# Patient Record
Sex: FEMALE | Race: WHITE | Employment: FULL TIME | URBAN - METROPOLITAN AREA
[De-identification: names, ages, dates, MRNs, and addresses within clinical notes are randomized per-mention and may not be internally consistent; named-entity substitution may affect disease eponyms.]

---

## 2017-01-10 ENCOUNTER — OFFICE VISIT (OUTPATIENT)
Dept: INTERNAL MEDICINE CLINIC | Facility: CLINIC | Age: 47
End: 2017-01-10

## 2017-01-10 VITALS
WEIGHT: 184 LBS | RESPIRATION RATE: 16 BRPM | HEIGHT: 61 IN | HEART RATE: 84 BPM | DIASTOLIC BLOOD PRESSURE: 72 MMHG | BODY MASS INDEX: 34.74 KG/M2 | SYSTOLIC BLOOD PRESSURE: 110 MMHG

## 2017-01-10 DIAGNOSIS — Z51.81 ENCOUNTER FOR THERAPEUTIC DRUG MONITORING: Primary | ICD-10-CM

## 2017-01-10 DIAGNOSIS — E66.9 OBESITY (BMI 30.0-34.9): ICD-10-CM

## 2017-01-10 PROCEDURE — 99213 OFFICE O/P EST LOW 20 MIN: CPT | Performed by: NURSE PRACTITIONER

## 2017-01-10 RX ORDER — ZONISAMIDE 100 MG/1
100 CAPSULE ORAL NIGHTLY
Qty: 30 CAPSULE | Refills: 0 | Status: SHIPPED | OUTPATIENT
Start: 2017-01-10 | End: 2017-02-09

## 2017-01-10 RX ORDER — BUPROPION HYDROCHLORIDE 100 MG/1
100 TABLET, EXTENDED RELEASE ORAL 2 TIMES DAILY
COMMUNITY
End: 2017-01-10 | Stop reason: DRUGHIGH

## 2017-01-10 RX ORDER — BUPROPION HYDROCHLORIDE 150 MG/1
150 TABLET ORAL DAILY
Qty: 30 TABLET | Refills: 0 | Status: SHIPPED | OUTPATIENT
Start: 2017-01-10 | End: 2017-03-07

## 2017-01-10 RX ORDER — BUPROPION HYDROCHLORIDE 100 MG/1
100 TABLET, EXTENDED RELEASE ORAL 2 TIMES DAILY
Qty: 60 TABLET | Refills: 0 | Status: CANCELLED | OUTPATIENT
Start: 2017-01-10

## 2017-01-10 NOTE — PROGRESS NOTES
CC: Patient presents with:  Weight Check: 3 pound weight gain       HPI:   Obesity. Patient gained weight on 1 month of wellbutrin but felt it really helped with stress and stress eating.  Some sleeping problems though but not sure if related to well without murmur  GI: +BS's    No orders of the defined types were placed in this encounter. Signed Prescriptions Disp Refills    zonisamide 100 MG Oral Cap 30 capsule 0      Sig: Take 1 capsule (100 mg total) by mouth nightly.       BuPROPion HCl ER,

## 2017-01-10 NOTE — PATIENT INSTRUCTIONS
Weight Management: Exercise and Activity  Studies show that people who exercise are the most likely to lose weight and keep it off. Exercise burns calories. It helps build muscle to make your body stronger.  Make exercise an important part of your weight- © 9078-8708 [address], [address] [place]. All rights reserved. This information is not intended as a substitute for professional medical care. Always follow your healthcare professional's instructions.         Exercis What will help you to stick with it? 1.   2.   3.   Date Last Reviewed: 8/13/2015  © 6637-1310 [facility] [place], [address] [place] [address] [place]. All rights reserved.  This information is not intended as a substitute for professional med

## 2017-02-16 ENCOUNTER — OFFICE VISIT (OUTPATIENT)
Dept: INTERNAL MEDICINE CLINIC | Facility: CLINIC | Age: 47
End: 2017-02-16

## 2017-02-16 VITALS
HEIGHT: 61 IN | RESPIRATION RATE: 16 BRPM | WEIGHT: 183 LBS | HEART RATE: 80 BPM | SYSTOLIC BLOOD PRESSURE: 122 MMHG | BODY MASS INDEX: 34.55 KG/M2 | DIASTOLIC BLOOD PRESSURE: 82 MMHG

## 2017-02-16 DIAGNOSIS — E66.9 OBESITY (BMI 30.0-34.9): ICD-10-CM

## 2017-02-16 DIAGNOSIS — Z51.81 ENCOUNTER FOR THERAPEUTIC DRUG MONITORING: Primary | ICD-10-CM

## 2017-02-16 DIAGNOSIS — E28.2 PCOS (POLYCYSTIC OVARIAN SYNDROME): ICD-10-CM

## 2017-02-16 PROCEDURE — 99213 OFFICE O/P EST LOW 20 MIN: CPT | Performed by: NURSE PRACTITIONER

## 2017-02-16 RX ORDER — ZONISAMIDE 100 MG/1
100 CAPSULE ORAL DAILY
COMMUNITY
End: 2017-02-16

## 2017-02-16 RX ORDER — METFORMIN HYDROCHLORIDE 750 MG/1
750 TABLET, EXTENDED RELEASE ORAL DAILY
Qty: 30 TABLET | Refills: 1 | Status: SHIPPED | OUTPATIENT
Start: 2017-02-16 | End: 2017-03-07

## 2017-02-16 RX ORDER — BUPROPION HYDROCHLORIDE 150 MG/1
150 TABLET ORAL DAILY
Qty: 30 TABLET | Refills: 0 | Status: CANCELLED | OUTPATIENT
Start: 2017-02-16

## 2017-02-16 RX ORDER — ZONISAMIDE 100 MG/1
100 CAPSULE ORAL DAILY
Qty: 30 CAPSULE | Refills: 0 | Status: CANCELLED | OUTPATIENT
Start: 2017-02-16

## 2017-02-16 NOTE — PATIENT INSTRUCTIONS
Polycystic Ovary Syndrome  Polycystic ovary syndrome (PCOS) causes harmless, small cysts in the ovaries and other symptoms. PCOS is caused by certain hormones being out of balance. The word “syndrome” means a group of symptoms.  Women with PCOS may have n

## 2017-02-16 NOTE — PROGRESS NOTES
CC: Patient presents with:  Weight Check: lost 1 pound       HPI:   Obesity. Patient feels that zonisamide did nothing and had stopped taking but wellbutrin is helping with stress eating as has hakeem very busy with work. Some walking.  She is not sleep Refills    MetFORMIN HCl  MG Oral Tablet 24 Hr 30 tablet 1      Sig: Take 1 tablet (750 mg total) by mouth daily. None     ASSESSMENT:   Encounter for therapeutic drug monitoring  (primary encounter diagnosis)  Obesity (BMI 30.0-34. 9)  PCOS

## 2017-03-03 RX ORDER — ERGOCALCIFEROL 1.25 MG/1
CAPSULE ORAL
Qty: 12 CAPSULE | Refills: 0 | OUTPATIENT
Start: 2017-03-03

## 2017-03-07 ENCOUNTER — OFFICE VISIT (OUTPATIENT)
Dept: INTERNAL MEDICINE CLINIC | Facility: CLINIC | Age: 47
End: 2017-03-07

## 2017-03-07 VITALS
RESPIRATION RATE: 16 BRPM | HEIGHT: 61 IN | HEART RATE: 80 BPM | DIASTOLIC BLOOD PRESSURE: 76 MMHG | WEIGHT: 183 LBS | SYSTOLIC BLOOD PRESSURE: 118 MMHG | BODY MASS INDEX: 34.55 KG/M2

## 2017-03-07 DIAGNOSIS — E66.9 OBESITY (BMI 30.0-34.9): ICD-10-CM

## 2017-03-07 DIAGNOSIS — Z51.81 ENCOUNTER FOR THERAPEUTIC DRUG MONITORING: Primary | ICD-10-CM

## 2017-03-07 DIAGNOSIS — E55.9 VITAMIN D DEFICIENCY: ICD-10-CM

## 2017-03-07 DIAGNOSIS — E28.2 PCOS (POLYCYSTIC OVARIAN SYNDROME): ICD-10-CM

## 2017-03-07 PROCEDURE — 99213 OFFICE O/P EST LOW 20 MIN: CPT | Performed by: NURSE PRACTITIONER

## 2017-03-07 RX ORDER — METFORMIN HYDROCHLORIDE 750 MG/1
750 TABLET, EXTENDED RELEASE ORAL DAILY
Qty: 30 TABLET | Refills: 1 | Status: SHIPPED | OUTPATIENT
Start: 2017-03-07 | End: 2017-04-04

## 2017-03-07 RX ORDER — BUPROPION HYDROCHLORIDE 150 MG/1
150 TABLET ORAL DAILY
Qty: 30 TABLET | Refills: 0 | Status: CANCELLED | OUTPATIENT
Start: 2017-03-07

## 2017-03-07 NOTE — PROGRESS NOTES
CC: Patient presents with:  Weight Check: no weight change       HPI:   Obesity. Patient tolerating metformin had some stomach upset/diarrhea first  2 weeks but has gotten better.  She had not taken wellbutrin this last month but felt it did not do a drug monitoring  (primary encounter diagnosis)  Obesity (BMI 30.0-34. 9)  PCOS (polycystic ovarian syndrome)  Vitamin D deficiency     PLAN:  1. Obesity(BMI 30.0-39.0). Patient remained the same on 1st month of metformin  Will continue metformin.  Taking VSL

## 2017-03-07 NOTE — PATIENT INSTRUCTIONS
Healthy Snacking  A common myth about snacking is that it’s not good for you. This might be true if you’re helping yourself to candy, chips, or other junk foods throughout the day.  But healthy snacking is possible — it’s what you eat and how much you eat · Vegetable sticks or baby carrots  · Mini-boxes of raisins  · Unsweetened juice boxes  · Unsalted nuts and seeds  Snack supplies for home and work  · Raw vegetables  · Fat-free or low-fat yogurt  · Unsweetened applesauce or canned fruit  · Low-fat cheese · Lean low-fat meat and poultry  · Eggs  · Beans, peas  · Tofu  · Unsalted nuts and seeds  Choose less high-fat and red meat. Source: [place], www.choosemyplate. gov  Know your limits on oils (fats) and sugars:  · Your allowance for oils is 17 grams 1½ ounces reduced-fat hard cheese  2 ounces processed cheese  1 cup low-fat yogurt  1/3 cup shredded cheese  Note: Choose low-fat or fat-free most often.     Protein 3 Servings or 3 Ounces  One serving is:  1 ounce cooked lean beef, pork, lamb, or ham  1 o

## 2017-03-31 ENCOUNTER — TELEPHONE (OUTPATIENT)
Dept: INTERNAL MEDICINE CLINIC | Facility: CLINIC | Age: 47
End: 2017-03-31

## 2017-04-04 ENCOUNTER — OFFICE VISIT (OUTPATIENT)
Dept: INTERNAL MEDICINE CLINIC | Facility: CLINIC | Age: 47
End: 2017-04-04

## 2017-04-04 VITALS
SYSTOLIC BLOOD PRESSURE: 112 MMHG | HEART RATE: 80 BPM | HEIGHT: 61 IN | WEIGHT: 185 LBS | BODY MASS INDEX: 34.93 KG/M2 | RESPIRATION RATE: 16 BRPM | DIASTOLIC BLOOD PRESSURE: 82 MMHG

## 2017-04-04 DIAGNOSIS — Z51.81 ENCOUNTER FOR THERAPEUTIC DRUG MONITORING: Primary | ICD-10-CM

## 2017-04-04 DIAGNOSIS — E66.9 OBESITY (BMI 30.0-34.9): ICD-10-CM

## 2017-04-04 DIAGNOSIS — E28.2 PCOS (POLYCYSTIC OVARIAN SYNDROME): ICD-10-CM

## 2017-04-04 PROCEDURE — 99213 OFFICE O/P EST LOW 20 MIN: CPT | Performed by: NURSE PRACTITIONER

## 2017-04-04 RX ORDER — LACTOBACIL 2/BIFIDO 1/S.THERMO 450B CELL
1 PACKET (EA) ORAL EVERY MORNING
Qty: 60 CAPSULE | Refills: 0 | Status: SHIPPED | OUTPATIENT
Start: 2017-04-04 | End: 2017-06-02

## 2017-04-04 RX ORDER — METFORMIN HYDROCHLORIDE 750 MG/1
1500 TABLET, EXTENDED RELEASE ORAL DAILY
Qty: 30 TABLET | Refills: 1 | Status: SHIPPED | OUTPATIENT
Start: 2017-04-04 | End: 2017-05-01

## 2017-04-04 NOTE — PROGRESS NOTES
CC: Patient presents with:  Weight Check: 2 pound weight gain       HPI:   Obesity. Patient tolerating metformin without problems. She is frustrated with weigh going up and down and not really losing.   She is not ever hungry and tries to snack during HCl  MG Oral Tablet 24 Hr 30 tablet 1      Sig: Take 2 tablets (1,500 mg total) by mouth daily. Probiotic Product (VSL#3) Oral Cap 60 capsule 0      Sig: Take 1 capsule by mouth every morning.           None     ASSESSMENT:   Encounter for therap

## 2017-04-04 NOTE — PATIENT INSTRUCTIONS
MyPlate Worksheet: 5,927 Calories  Your calorie needs are about 1,200 calories a day. Below are the U.S. Department of Agriculture (USDA) guidelines for your daily recommended amount of each food group.   Vegetables  1½ cups Fruits  1 cup Grains  4 ounces · Cut back on salt (sodium). Stay under 2,300 mg sodium a day. If you have a health condition such as heart disease or high blood pressure, your doctor will likely tell you to limit sodium to no more than 1,500 mg a day. Get moving and be active!   Aim for 2 tablespovesna reddymus     Date Last Reviewed: 6/1/2015  © 1604-6464 [employer] [address] [place] [address], [address] [place], [place] [address]. All rights reserved. This information is not intended as a substitute for professional medical care.  Always follow your To find healthy foods that help you control blood sugar, learn how to read food labels. Look for the Nutrition Facts label on packaged foods. It will tell you how much carbohydrate, sugar, fat, and fiber is in each serving.  Then, you can decide whether or When it comes to blood sugar control, when you eat is as important as what you eat. You may need to eat several small meals spaced evenly throughout the day to stay in your target range.  So don’t skip breakfast or wait until late in the day to get most of

## 2017-04-19 ENCOUNTER — TELEPHONE (OUTPATIENT)
Dept: INTERNAL MEDICINE CLINIC | Facility: CLINIC | Age: 47
End: 2017-04-19

## 2017-05-01 ENCOUNTER — OFFICE VISIT (OUTPATIENT)
Dept: OBGYN CLINIC | Facility: CLINIC | Age: 47
End: 2017-05-01

## 2017-05-01 VITALS
HEIGHT: 62 IN | BODY MASS INDEX: 34.23 KG/M2 | WEIGHT: 186 LBS | SYSTOLIC BLOOD PRESSURE: 122 MMHG | DIASTOLIC BLOOD PRESSURE: 80 MMHG | HEART RATE: 93 BPM

## 2017-05-01 DIAGNOSIS — N93.9 ABNORMAL UTERINE BLEEDING: Primary | ICD-10-CM

## 2017-05-01 DIAGNOSIS — Z12.4 CERVICAL CANCER SCREENING: ICD-10-CM

## 2017-05-01 PROCEDURE — 88175 CYTOPATH C/V AUTO FLUID REDO: CPT | Performed by: OBSTETRICS & GYNECOLOGY

## 2017-05-01 PROCEDURE — 99213 OFFICE O/P EST LOW 20 MIN: CPT | Performed by: OBSTETRICS & GYNECOLOGY

## 2017-05-01 PROCEDURE — 87624 HPV HI-RISK TYP POOLED RSLT: CPT | Performed by: OBSTETRICS & GYNECOLOGY

## 2017-05-02 NOTE — PROGRESS NOTES
GYN H&P     5/1/2017  10:27 PM    CC: Patient presents with:  Vaginal Bleeding: pt had ablation in May 2015, spotted for about 7 months and then stopped with no bleeding for over a year, last week pt started to bleed for 3 days       HPI: patient is a 56 y mouth. Take daily except day of high dose Disp:  Rfl:    Colchicine (MITIGARE) 0.6 MG Oral Cap Take by mouth. Disp:  Rfl:      No current facility-administered medications on file prior to visit.   Family History   Problem Relation Age of Onset   • Diabetes supported, urethra wnl, no lesions or fissures                     Vagina: normal pink mucosa, no lesions, normal clear discharge.                       Uterus:  mobile, non tender, normal size                     Cervix: parous, no lesions

## 2017-05-04 NOTE — PROGRESS NOTES
Quick Note:    Patient informed. Verbalized understanding. No further questions or concerns.   Call transferred to [facility] to schedule appointment.  ______

## 2017-05-23 DIAGNOSIS — N93.9 ABNORMAL UTERINE BLEEDING: Primary | ICD-10-CM

## 2017-05-25 ENCOUNTER — HOSPITAL ENCOUNTER (OUTPATIENT)
Dept: ULTRASOUND IMAGING | Facility: HOSPITAL | Age: 47
Discharge: HOME | End: 2017-05-25
Attending: OBSTETRICS & GYNECOLOGY
Payer: COMMERCIAL

## 2017-05-25 DIAGNOSIS — N93.9 ABNORMAL UTERINE BLEEDING: ICD-10-CM

## 2017-05-25 PROCEDURE — 76830 TRANSVAGINAL US NON-OB: CPT | Performed by: OBSTETRICS & GYNECOLOGY

## 2017-05-25 PROCEDURE — 76856 US EXAM PELVIC COMPLETE: CPT | Performed by: OBSTETRICS & GYNECOLOGY

## 2017-06-02 ENCOUNTER — OFFICE VISIT (OUTPATIENT)
Dept: OBGYN CLINIC | Facility: CLINIC | Age: 47
End: 2017-06-02

## 2017-06-02 ENCOUNTER — APPOINTMENT (OUTPATIENT)
Dept: LAB | Age: 47
End: 2017-06-02
Attending: OBSTETRICS & GYNECOLOGY
Payer: COMMERCIAL

## 2017-06-02 ENCOUNTER — TELEPHONE (OUTPATIENT)
Dept: OBGYN CLINIC | Facility: CLINIC | Age: 47
End: 2017-06-02

## 2017-06-02 VITALS
BODY MASS INDEX: 35.87 KG/M2 | WEIGHT: 190 LBS | SYSTOLIC BLOOD PRESSURE: 118 MMHG | DIASTOLIC BLOOD PRESSURE: 70 MMHG | HEIGHT: 61 IN

## 2017-06-02 DIAGNOSIS — N93.9 ABNORMAL UTERINE BLEEDING: ICD-10-CM

## 2017-06-02 DIAGNOSIS — R87.610 ASCUS OF CERVIX WITH NEGATIVE HIGH RISK HPV: Primary | ICD-10-CM

## 2017-06-02 DIAGNOSIS — E66.9 OBESITY (BMI 30.0-34.9): ICD-10-CM

## 2017-06-02 DIAGNOSIS — Z51.81 ENCOUNTER FOR THERAPEUTIC DRUG MONITORING: ICD-10-CM

## 2017-06-02 DIAGNOSIS — E55.9 VITAMIN D DEFICIENCY: ICD-10-CM

## 2017-06-02 PROCEDURE — 82670 ASSAY OF TOTAL ESTRADIOL: CPT

## 2017-06-02 PROCEDURE — 57454 BX/CURETT OF CERVIX W/SCOPE: CPT | Performed by: OBSTETRICS & GYNECOLOGY

## 2017-06-02 PROCEDURE — 36415 COLL VENOUS BLD VENIPUNCTURE: CPT

## 2017-06-02 PROCEDURE — 83001 ASSAY OF GONADOTROPIN (FSH): CPT

## 2017-06-02 PROCEDURE — 84443 ASSAY THYROID STIM HORMONE: CPT

## 2017-06-02 PROCEDURE — 88305 TISSUE EXAM BY PATHOLOGIST: CPT | Performed by: OBSTETRICS & GYNECOLOGY

## 2017-06-02 PROCEDURE — 81025 URINE PREGNANCY TEST: CPT | Performed by: OBSTETRICS & GYNECOLOGY

## 2017-06-02 PROCEDURE — 82306 VITAMIN D 25 HYDROXY: CPT

## 2017-06-02 NOTE — TELEPHONE ENCOUNTER
Pt has question about upcoming appt scheduled on 6/13/17   She wanted to verify that it is still needed after he apt on 6/2/17  Pt would like a call back

## 2017-06-02 NOTE — PROGRESS NOTES
GYN H&P     2017  2:07 PM    CC: Patient presents with:  Colposcopy: colpo and emb      HPI: patient is a 56year old  here for Patient presents with:  Colposcopy: colpo and emb      Pt is here for colposcopy.    Pap smear on 17 showed persis Smoking status: Former Smoker  0.20 Packs/Day     Smokeless tobacco: Never Used    Alcohol Use: Yes  1.2 oz/week    2 Standard drinks or equivalent per week         Comment: occassionally    Drug Use: No    Sexual Activity: Yes    Partners: Male     Other The transformation zone was  fully visualized. ECC was  collected.      Satisfactory Colposcopy  Impression: RAMSES 1             EXTREMITIES:  non tender without edema        A/P: Patient is 56year old female with (R87.610) ASCUS of cervix with negative hi

## 2017-06-13 ENCOUNTER — OFFICE VISIT (OUTPATIENT)
Dept: OBGYN CLINIC | Facility: CLINIC | Age: 47
End: 2017-06-13

## 2017-06-13 VITALS
SYSTOLIC BLOOD PRESSURE: 100 MMHG | DIASTOLIC BLOOD PRESSURE: 60 MMHG | HEIGHT: 61.25 IN | WEIGHT: 187 LBS | BODY MASS INDEX: 34.85 KG/M2 | HEART RATE: 86 BPM

## 2017-06-13 DIAGNOSIS — Z01.419 ENCOUNTER FOR GYNECOLOGICAL EXAMINATION WITHOUT ABNORMAL FINDING: Primary | ICD-10-CM

## 2017-06-13 DIAGNOSIS — Z12.4 CERVICAL CANCER SCREENING: ICD-10-CM

## 2017-06-13 DIAGNOSIS — Z12.39 BREAST CANCER SCREENING: ICD-10-CM

## 2017-06-13 DIAGNOSIS — R23.2 HOT FLASHES: ICD-10-CM

## 2017-06-13 DIAGNOSIS — Z98.890 HISTORY OF BREAST BIOPSY: ICD-10-CM

## 2017-06-13 PROCEDURE — 99396 PREV VISIT EST AGE 40-64: CPT | Performed by: OBSTETRICS & GYNECOLOGY

## 2017-06-13 NOTE — PROGRESS NOTES
GYN H&P     2017  4:58 PM    CC: Patient presents with:  Physical: PT would like review lab resuls       HPI: patient is a 56year old  here for her annual gyne exam.   She has no complaints.  Pt recently had pelvic exam/ colposcopy on 17, r Years of Education: N/A  Number of Children: N/A     Occupational History  None on file     Social History Main Topics   Smoking status: Former Smoker  0.20 Packs/Day     Smokeless tobacco: Never Used    Alcohol Use: Yes  1.2 oz/week    2 Standard drinks o no complaints. Here for well woman exam.       1. Encounter for gynecological examination without abnormal finding    -discussed diet and exercise      2. Cervical cancer screening  -RAMSES 1, repeat pap in 1 yr    3.  Breast cancer screening  -h/o breast biop

## 2017-06-21 ENCOUNTER — TELEPHONE (OUTPATIENT)
Dept: OBGYN CLINIC | Facility: CLINIC | Age: 47
End: 2017-06-21

## 2017-06-21 DIAGNOSIS — R92.8 FOLLOW-UP EXAMINATION OF ABNORMAL MAMMOGRAM: Primary | ICD-10-CM

## 2017-06-21 NOTE — TELEPHONE ENCOUNTER
Imaging calling and had Breast biopsy last November    Mammo order needs to say Diagnostic not routine  Pt also need US order    Can we change and add?

## 2017-06-22 ENCOUNTER — HOSPITAL ENCOUNTER (OUTPATIENT)
Dept: MAMMOGRAPHY | Facility: HOSPITAL | Age: 47
Discharge: HOME | End: 2017-06-22
Attending: OBSTETRICS & GYNECOLOGY
Payer: COMMERCIAL

## 2017-06-22 DIAGNOSIS — R92.8 FOLLOW-UP EXAMINATION OF ABNORMAL MAMMOGRAM: ICD-10-CM

## 2017-06-22 PROCEDURE — 77062 BREAST TOMOSYNTHESIS BI: CPT | Performed by: OBSTETRICS & GYNECOLOGY

## 2017-06-22 PROCEDURE — 77066 DX MAMMO INCL CAD BI: CPT | Performed by: OBSTETRICS & GYNECOLOGY

## 2017-06-22 PROCEDURE — 76642 ULTRASOUND BREAST LIMITED: CPT | Performed by: OBSTETRICS & GYNECOLOGY

## 2018-11-05 ENCOUNTER — OFFICE VISIT (OUTPATIENT)
Dept: OBGYN CLINIC | Facility: CLINIC | Age: 48
End: 2018-11-05
Payer: COMMERCIAL

## 2018-11-05 VITALS
DIASTOLIC BLOOD PRESSURE: 70 MMHG | SYSTOLIC BLOOD PRESSURE: 122 MMHG | HEIGHT: 61.25 IN | WEIGHT: 196 LBS | BODY MASS INDEX: 36.53 KG/M2

## 2018-11-05 DIAGNOSIS — R63.5 WEIGHT GAIN: ICD-10-CM

## 2018-11-05 DIAGNOSIS — Z12.39 BREAST CANCER SCREENING: ICD-10-CM

## 2018-11-05 DIAGNOSIS — Z23 NEED FOR VACCINATION: ICD-10-CM

## 2018-11-05 DIAGNOSIS — R23.2 HOT FLASHES: ICD-10-CM

## 2018-11-05 DIAGNOSIS — Z12.4 CERVICAL CANCER SCREENING: ICD-10-CM

## 2018-11-05 DIAGNOSIS — Z01.419 WELL WOMAN EXAM: Primary | ICD-10-CM

## 2018-11-05 PROCEDURE — 99396 PREV VISIT EST AGE 40-64: CPT | Performed by: OBSTETRICS & GYNECOLOGY

## 2018-11-05 PROCEDURE — 87624 HPV HI-RISK TYP POOLED RSLT: CPT | Performed by: OBSTETRICS & GYNECOLOGY

## 2018-11-05 PROCEDURE — 88175 CYTOPATH C/V AUTO FLUID REDO: CPT | Performed by: OBSTETRICS & GYNECOLOGY

## 2018-11-05 PROCEDURE — 90471 IMMUNIZATION ADMIN: CPT | Performed by: OBSTETRICS & GYNECOLOGY

## 2018-11-05 PROCEDURE — 90686 IIV4 VACC NO PRSV 0.5 ML IM: CPT | Performed by: OBSTETRICS & GYNECOLOGY

## 2018-11-05 NOTE — PROGRESS NOTES
GYN H&P     2018  4:41 PM    CC: Patient presents with:  Physical: annual and mammo order, pt is conceren about wt gain and having hot flashes and facial hair and troble sleeping      HPI: patient is a 53year old  here for her annual gyne exam biopsy of uterus    • TONSILLECTOMY         Acetaminophen           NAUSEA AND VOMITING  Tylenol [Acetaminop*    NAUSEA AND VOMITING    No current outpatient medications on file prior to visit.   No current facility-administered medications on file prior to and fatigue   HENT: Negative congestion, sore throat   Eyes: Negative for pain and visual disturbance. Respiratory: Negative for cough, shortness of breath and wheezing. Cardiovascular: Negative for chest pain, palpitations and leg swelling.    [place] Future    3. Breast cancer screening    - [facility]CHIS 2D+3D SCREENING BILAT (CPT=77067/62293); Future    4. Hot flashes  -discussed options OTC estroven, consider HRT  -will recheck fsh/ estradiol    5. Well woman exam  -discussed diet and exercise    6.  Ro Riding

## 2018-12-12 ENCOUNTER — HOSPITAL ENCOUNTER (OUTPATIENT)
Dept: MAMMOGRAPHY | Facility: HOSPITAL | Age: 48
Discharge: HOME | End: 2018-12-12
Attending: OBSTETRICS & GYNECOLOGY
Payer: COMMERCIAL

## 2018-12-12 ENCOUNTER — APPOINTMENT (OUTPATIENT)
Dept: LAB | Age: 48
End: 2018-12-12
Attending: OBSTETRICS & GYNECOLOGY
Payer: COMMERCIAL

## 2018-12-12 DIAGNOSIS — R23.2 HOT FLASHES: ICD-10-CM

## 2018-12-12 DIAGNOSIS — Z12.39 BREAST CANCER SCREENING: ICD-10-CM

## 2018-12-12 PROCEDURE — 36415 COLL VENOUS BLD VENIPUNCTURE: CPT

## 2018-12-12 PROCEDURE — 77063 BREAST TOMOSYNTHESIS BI: CPT | Performed by: OBSTETRICS & GYNECOLOGY

## 2018-12-12 PROCEDURE — 77067 SCR MAMMO BI INCL CAD: CPT | Performed by: OBSTETRICS & GYNECOLOGY

## 2018-12-12 PROCEDURE — 82670 ASSAY OF TOTAL ESTRADIOL: CPT

## 2018-12-12 PROCEDURE — 83001 ASSAY OF GONADOTROPIN (FSH): CPT

## 2019-12-02 ENCOUNTER — TELEPHONE (OUTPATIENT)
Dept: OBGYN CLINIC | Facility: CLINIC | Age: 49
End: 2019-12-02

## 2019-12-02 DIAGNOSIS — Z12.31 ENCOUNTER FOR SCREENING MAMMOGRAM FOR BREAST CANCER: Primary | ICD-10-CM

## 2019-12-02 NOTE — TELEPHONE ENCOUNTER
Patient would like her mammo ordered. She is due.   Future Appointments   Date Time Provider Gerhard Guzman   1/8/2020 10:00 AM SYLVIE Ivey [facility] [place]

## 2019-12-23 ENCOUNTER — HOSPITAL ENCOUNTER (OUTPATIENT)
Dept: MAMMOGRAPHY | Age: 49
Discharge: HOME | End: 2019-12-23
Attending: NURSE PRACTITIONER
Payer: COMMERCIAL

## 2019-12-23 DIAGNOSIS — Z12.31 ENCOUNTER FOR SCREENING MAMMOGRAM FOR BREAST CANCER: ICD-10-CM

## 2019-12-23 PROCEDURE — 77067 SCR MAMMO BI INCL CAD: CPT | Performed by: NURSE PRACTITIONER
